# Patient Record
Sex: FEMALE | Race: WHITE
[De-identification: names, ages, dates, MRNs, and addresses within clinical notes are randomized per-mention and may not be internally consistent; named-entity substitution may affect disease eponyms.]

---

## 2017-04-04 ENCOUNTER — HOSPITAL ENCOUNTER (INPATIENT)
Dept: HOSPITAL 74 - FM/S | Age: 82
LOS: 2 days | Discharge: HOME HEALTH | DRG: 470 | End: 2017-04-06
Attending: ORTHOPAEDIC SURGERY | Admitting: ORTHOPAEDIC SURGERY
Payer: COMMERCIAL

## 2017-04-04 VITALS — BODY MASS INDEX: 28.1 KG/M2

## 2017-04-04 DIAGNOSIS — I25.10: ICD-10-CM

## 2017-04-04 DIAGNOSIS — M16.12: Primary | ICD-10-CM

## 2017-04-04 DIAGNOSIS — E78.5: ICD-10-CM

## 2017-04-04 DIAGNOSIS — I10: ICD-10-CM

## 2017-04-04 DIAGNOSIS — Z95.5: ICD-10-CM

## 2017-04-04 PROCEDURE — 8E0Y0CZ ROBOTIC ASSISTED PROCEDURE OF LOWER EXTREMITY, OPEN APPROACH: ICD-10-PCS | Performed by: ORTHOPAEDIC SURGERY

## 2017-04-04 PROCEDURE — 0SRB01A REPLACEMENT OF LEFT HIP JOINT WITH METAL SYNTHETIC SUBSTITUTE, UNCEMENTED, OPEN APPROACH: ICD-10-PCS | Performed by: ORTHOPAEDIC SURGERY

## 2017-04-04 RX ADMIN — CEFAZOLIN SODIUM SCH MLS/HR: 1 INJECTION, SOLUTION INTRAVENOUS at 20:36

## 2017-04-04 RX ADMIN — GABAPENTIN ONE MG: 300 CAPSULE ORAL at 09:45

## 2017-04-04 RX ADMIN — DOCUSATE SODIUM,SENNOSIDES SCH TABLET: 50; 8.6 TABLET, FILM COATED ORAL at 21:24

## 2017-04-04 RX ADMIN — ACETAMINOPHEN SCH MG: 325 TABLET ORAL at 14:30

## 2017-04-04 RX ADMIN — ONDANSETRON PRN MG: 2 INJECTION INTRAMUSCULAR; INTRAVENOUS at 14:30

## 2017-04-04 RX ADMIN — ATORVASTATIN CALCIUM SCH MG: 10 TABLET, FILM COATED ORAL at 21:24

## 2017-04-04 RX ADMIN — CELECOXIB ONE MG: 200 CAPSULE ORAL at 09:45

## 2017-04-04 RX ADMIN — ACETAMINOPHEN SCH MG: 325 TABLET ORAL at 20:36

## 2017-04-04 RX ADMIN — GABAPENTIN SCH MG: 300 CAPSULE ORAL at 21:24

## 2017-04-05 LAB
ANION GAP SERPL CALC-SCNC: 6 MMOL/L (ref 8–16)
CALCIUM SERPL-MCNC: 8.8 MG/DL (ref 8.4–10.2)
CO2 SERPL-SCNC: 26 MMOL/L (ref 22–28)
CREAT SERPL-MCNC: 0.8 MG/DL (ref 0.6–1.3)
DEPRECATED RDW RBC AUTO: 12.8 % (ref 11.6–15.6)
GLUCOSE SERPL-MCNC: 125 MG/DL (ref 74–106)
MCH RBC QN AUTO: 30.7 PG (ref 25.7–33.7)
MCHC RBC AUTO-ENTMCNC: 33.5 G/DL (ref 32–36)
MCV RBC: 91.8 FL (ref 80–96)
PLATELET # BLD AUTO: 202 K/MM3 (ref 134–434)
PMV BLD: 7.6 FL (ref 7.5–11.1)
WBC # BLD AUTO: 10.5 K/MM3 (ref 4–10)

## 2017-04-05 RX ADMIN — DOCUSATE SODIUM,SENNOSIDES SCH TABLET: 50; 8.6 TABLET, FILM COATED ORAL at 09:30

## 2017-04-05 RX ADMIN — GABAPENTIN SCH MG: 300 CAPSULE ORAL at 21:20

## 2017-04-05 RX ADMIN — CEFAZOLIN SODIUM SCH MLS/HR: 1 INJECTION, SOLUTION INTRAVENOUS at 01:24

## 2017-04-05 RX ADMIN — ATORVASTATIN CALCIUM SCH MG: 10 TABLET, FILM COATED ORAL at 21:20

## 2017-04-05 RX ADMIN — DOCUSATE SODIUM,SENNOSIDES SCH TABLET: 50; 8.6 TABLET, FILM COATED ORAL at 21:20

## 2017-04-05 RX ADMIN — ATENOLOL SCH MG: 25 TABLET ORAL at 09:30

## 2017-04-05 RX ADMIN — GABAPENTIN ONE: 300 CAPSULE ORAL at 11:08

## 2017-04-05 RX ADMIN — ACETAMINOPHEN SCH MG: 325 TABLET ORAL at 03:00

## 2017-04-05 RX ADMIN — ACETAMINOPHEN SCH MG: 325 TABLET ORAL at 15:01

## 2017-04-05 RX ADMIN — PANTOPRAZOLE SODIUM SCH MG: 40 TABLET, DELAYED RELEASE ORAL at 09:31

## 2017-04-05 RX ADMIN — CELECOXIB ONE: 200 CAPSULE ORAL at 11:08

## 2017-04-05 RX ADMIN — ACETAMINOPHEN SCH MG: 325 TABLET ORAL at 09:31

## 2017-04-05 RX ADMIN — FERROUS SULFATE TAB EC 324 MG (65 MG FE EQUIVALENT) SCH MG: 324 (65 FE) TABLET DELAYED RESPONSE at 09:31

## 2017-04-05 RX ADMIN — MULTIVITAMIN TABLET SCH TAB: TABLET at 09:31

## 2017-04-05 RX ADMIN — GABAPENTIN SCH MG: 300 CAPSULE ORAL at 09:31

## 2017-04-05 RX ADMIN — ACETAMINOPHEN SCH MG: 325 TABLET ORAL at 20:42

## 2017-04-05 RX ADMIN — VALSARTAN SCH MG: 160 TABLET, FILM COATED ORAL at 09:31

## 2017-04-05 RX ADMIN — ONDANSETRON PRN MG: 2 INJECTION INTRAMUSCULAR; INTRAVENOUS at 15:01

## 2017-04-05 RX ADMIN — ONDANSETRON PRN MG: 2 INJECTION INTRAMUSCULAR; INTRAVENOUS at 07:53

## 2017-04-05 RX ADMIN — ASPIRIN 325 MG ORAL TABLET SCH MG: 325 PILL ORAL at 07:54

## 2017-04-05 NOTE — SPEC
DATE OF OPERATION:  04/05/2017 

 

PREOPERATIVE DIAGNOSIS:  Degenerative Joint Disease, Left Hip

 

POSTOPERATIVE DIAGNOSIS:  Degenerative Joint disease, Left Hip 

 

PROCEDURE:  Left Total Hip Replacement with Robotic Arm Navigation Assistance

(MAKOplasty)

 

SURGEON: Dr. Sarthak Melendez 

 

FIRST ASSISTANT:

 

ANESTHESIA: Spinal and Regional. 

 

ANESTHESIOLOGIST: 

 

CLOSURE:  Tatum total hip system a No. 6 Accolade 2, a 38 plus 3 MDM and 52

titanium acetabular component, No. 1 Vicryl for fascia, 0 and 2-0 subcutaneous, 3-0

Monocryl subcuticular and skin glue for skin, 4-0 undyed Vicryl for pin sites.

 

ESTIMATED BLOOD LOSS:  Less Than 100 mL.

 

COMPLICATIONS:  None. 

 

CONDITION:  To recovery room in stable condition.   

 

DESCRIPTION OF OPERATIVE PROCEDURE: The patient was taken to the operating room. 

Spinal anesthesia as well as sciatic block was administered by the anesthesiologist. 

The IV Kefzol and TXA were administered prophylactically prior to the case.  The

patient was placed in the lateral decubitus position with all prominences

well-padded.  An EKG pad was secured to the inferior pole of the patella for limb

length calculations intraoperatively.  The left hip area was prepped and draped in

the usual sterile fashion. 

 

A 12.0-15.0 cm curved longitudinal incision over the posterolateral aspect of the

greater trochanter was made.  Hemostasis was achieved with Bovie cautery.  Sharp

dissection was carried down to the level of the fascia.  The fascia was opened the

entire length of the incision, spreading the gluteus ramón fibers in the direction

of their origin.  A Charnley retractor was placed in this layer, and care was taken

to be far away from the sciatic nerve.  The short external rotators were detached off

the insertion of the greater trochanter and peeled off the capsule.  A posterior

capsulectomy was then performed.  A checkpoint was malleted into the greater

trochanter.  Three small stab incisions were done on the iliac crest.  Through these

stab incisions, threaded guide pins were drilled into the iliac crest.  These pins

were fastened to the Navigation array. The check point on the greater trochanter, and

the EKG pad on the inferior pole of the patella were used to measure preoperative

limb length and offset.  The hip was then dislocated.  The hip was osteotomized at

the appropriate level as directed by the preoperative template. Anterior and

posterior retractors were placed around the acetabulum.  Circumferential labrum was

excised.  A checkpoint was malleted into the acetabulum superiorly.  The acetabulum

was then registered with the Navigation device with multiple sites within the

acetabulum and around the rim of the acetabulum. I t was then confirmed popping the

blue bubbles, confirming ideal position and confirmation of adequate registration

with the Navigation device.  Using a 48 reamer, which was decided preoperatively on

the preoperative template, the robotic arm was brought into the field and was used to

ream the acetabulum down to the appropriate depth with the appropriate orientation

and inversion applied.  After reaming, a good hemispherical bleeding surface was

encountered in the acetabulum.  A VINH shell of the appropriate size was then

malleted into place achieving excellent fit.  Confirmation of the appropriate

orientation and inversion was confirmed using the probe, and assuring that the

acetabular cup was placed in the ideal position as templated preoperatively.  A real

liner was then clipped into place with a 10 degree lip in the posterior-superior

quadrant.  Anterior and posterior osteophytes were removed using osteotome. 

 

Next, our attention was directed to the femur.  The proximal femur was opened with a

box chisel, rat-tail, anchovy and serial reamers.  This was done until the

appropriate reamer achieved good fit and fill of the proximal femur.  A trial

reduction with the appropriate neck and head, as again measured from our preoperative

template, was performed.  Limb lengths were confirmed both visually and using the

Navigation device, again measuring the inferior pole of the patella and the

checkpoint of the greater trochanter.  This confirmed ideal position of the femoral

component, lengths and offset. The trial components were removed.  The real component

was malleted into place.  The head was cold-welded to the Charnley and the hip was

reduced.  Again, the hip was found to have equal limb lengths as described

previously.  The hip was also taken through a range of motion and found to be stable

in external rotation and extension, was stable in marked flexion, stable in adduction

and internal rotation, and had a positive hang test and negative telescoping. 

 

The hip was irrigated with copious amounts of irrigation.  Hemostasis was achieved. 

Vancomycin powder was sprinkled into the joint.  A second dose of TXA was

administered.  The fascia was closed with No. 1 Vicryl interrupted suture, 0 and 2-0

for subcutaneous, and 3-0 Monocryl subcuticular for skin with skin glue.  This was

followed by an Aquacel dressing.  The patient was flipped into the supine position. 

Bilateral SCDs and an abduction pillow were applied.  X-rays showed good position of

the components. 

 

The patient was awakened from anesthesia and transferred to the recovery room in

stable condition. 

 

COMPLICATIONS:  None.

 

ESTIMATED BLOOD LOSS:  Less than 100 mL.  

 

 

WILDA [id number]

DD: 04/04/2017 14:45

DT: 04/05/2017 12:31

Job #:  [id number]

## 2017-04-05 NOTE — PN
Progress Note (short form)





- Note


Progress Note: 


Ortho





Pt seen and examined s/p left cesar thr pod #1


 Selected Entries











  04/05/17





  06:00


 


Temperature 99.0 F


 


Pulse Rate 55 L


 


Respiratory 18





Rate 


 


Blood Pressure 133/58








dressing c/d/i, calf soft nt


nvi





a/p


PT


dvt ppx


pain control


d/c home tomorrow if stable

## 2017-04-06 VITALS — TEMPERATURE: 98.8 F | SYSTOLIC BLOOD PRESSURE: 141 MMHG | DIASTOLIC BLOOD PRESSURE: 66 MMHG | HEART RATE: 67 BPM

## 2017-04-06 LAB
DEPRECATED RDW RBC AUTO: 13.1 % (ref 11.6–15.6)
MCH RBC QN AUTO: 32.2 PG (ref 25.7–33.7)
MCHC RBC AUTO-ENTMCNC: 35.3 G/DL (ref 32–36)
MCV RBC: 91.1 FL (ref 80–96)
PLATELET # BLD AUTO: 187 K/MM3 (ref 134–434)
PMV BLD: 7.9 FL (ref 7.5–11.1)
WBC # BLD AUTO: 9.8 K/MM3 (ref 4–10)

## 2017-04-06 RX ADMIN — ACETAMINOPHEN SCH: 325 TABLET ORAL at 03:20

## 2017-04-06 RX ADMIN — MULTIVITAMIN TABLET SCH TAB: TABLET at 09:20

## 2017-04-06 RX ADMIN — FERROUS SULFATE TAB EC 324 MG (65 MG FE EQUIVALENT) SCH MG: 324 (65 FE) TABLET DELAYED RESPONSE at 09:19

## 2017-04-06 RX ADMIN — DOCUSATE SODIUM,SENNOSIDES SCH TABLET: 50; 8.6 TABLET, FILM COATED ORAL at 09:20

## 2017-04-06 RX ADMIN — ASPIRIN 325 MG ORAL TABLET SCH MG: 325 PILL ORAL at 08:05

## 2017-04-06 RX ADMIN — VALSARTAN SCH MG: 160 TABLET, FILM COATED ORAL at 09:20

## 2017-04-06 RX ADMIN — ACETAMINOPHEN SCH MG: 325 TABLET ORAL at 08:05

## 2017-04-06 RX ADMIN — PANTOPRAZOLE SODIUM SCH MG: 40 TABLET, DELAYED RELEASE ORAL at 09:20

## 2017-04-06 RX ADMIN — GABAPENTIN SCH MG: 300 CAPSULE ORAL at 09:20

## 2017-04-06 RX ADMIN — ATENOLOL SCH MG: 25 TABLET ORAL at 09:20

## 2017-04-06 NOTE — PN
Progress Note (short form)





- Note


Progress Note: 


Ortho





Pt seen and examined s/p left cesar thr pod #2


 


 Selected Entries











  04/06/17





  06:34


 


Temperature 98.8 F


 


Pulse Rate 67


 


Respiratory 18





Rate 


 


Blood Pressure 141/66








 Laboratory Tests











  04/05/17





  08:30


 


WBC  10.5 H


 


Hgb  11.0


 


Hct  32.9


 


Plt Count  202











dressing c/d/i, calf soft nt


nvi





a/p


PT


dvt ppx


pain control


d/c home today


f/ u i n 1 week

## 2017-04-06 NOTE — PATH
Surgical Pathology Report



Patient Name:  RICCI MOSER

Accession #:  

Med. Rec. #:  [id number]                                                        

   /Age/Gender:  1928 (Age: 88) / F

Account:  [id number]                                                          

             Location: [facility]

Taken:  2017

Received:  2017

Reported:  2017

Physicians:  Sarthak Melendez M.D.

  



Specimen(s) Received

 LEFT FEMORAL HEAD 





Clinical History

Left hip osteoarthritis







Final Diagnosis

FEMORAL HEAD, LEFT, TOTAL HIP REPLACEMENT (MAKOPLASTY):

DEGENERATIVE JOINT DISEASE.





***Electronically Signed***

Alexander Finkelstein, M.D.





Gross Description

Received in formalin, labeled "left femoral head" is a 4.8 x 4.8 x 4.7 cm

femoral head with a 1.0 cm in length portion of femoral neck attached. The

margin of resection is smooth. There is a 4.3 cm in greatest dimension area of

eburnation present. The remaining articular surface is tan-yellow and diffusely

nodular and granular. The underlying trabecular bone is yellow and

heterogeneous. A representative section is submitted in one cassette, following

decalcification.

## 2017-04-06 NOTE — DS
Physical Examination


Vital Signs: 


 Vital Signs











Temperature  98.8 F   04/06/17 06:34


 


Pulse Rate  67   04/06/17 06:34


 


Respiratory Rate  18   04/06/17 06:34


 


Blood Pressure  141/66   04/06/17 06:34


 


O2 Sat by Pulse Oximetry (%)  95   04/06/17 06:34











Labs: 


 CBC, BMP





 04/05/17 08:30 





 04/05/17 08:30 











Discharge Summary


Reason For Visit: OSTEOARTHRITIS


Procedures: Principal: s/p left cesar thr


Hospital Course: 


admitted for elective left cesar thr, uneventful post-op, stable for d/c


Condition: Good





- Instructions


Diet, Activity, Other Instructions: 


Post-op Instructions-Total Hip Replacement                                     

              





     Call the office for a follow-up  appointment in 1 week - [phone]


     Aspirin 325mg daily for 6 weeks. Pain medication was sent into your 

pharmacy.                      


     Apply  Graduated Compression Stockings (TEDs) to both lower extremities-

remove daily


      for hygiene  ONLY


     Apply Sequential Compression Device (SCDs)  to both Lower extremities 

remove for PT 


      and hygiene ONLY  


     Apply cold packs to affected area for 15 minutes every 2 hours.


     Physical Therapist will come to your home for the first 5 days. You will 

be set up with 


      outpatient PT at your first post-operative visit.


     Patient may ambulate as tolerated-encourage self care (at least every 2-3 

hours while awake) 


      with walker or cane


     Maintain Aquacel (waterproof) dressing to operative wound (will be 

removed by surgeon at 


      first office visit)


     Shower with Aquacel dressing in place-if Aquacel integrity compromised, 

remove and apply 


      dry sterile dressing and notify Orthopedist.  DO NOT SHOWER unless 

Orthopedists approves without Aquacel 


      dressing





     CONTACT THE OFFICE FOR ANY CHANGE IN YOUR CONDITION (for example-fever 


      greater than 102 degrees,excessive bleeding from operative site, purulent 

drainage, 


      severe swelling or pain)    GO TO THE EMERGENCY ROOM IF THERE IS A  

MEDICAL EMERGENCY





     Hip Precautions:  


      * Keep a rolled towel under affected heel while in bed or chair (to keep 

knee 


        in extension)


      * Dependent upon approach:


            * Posterior - do not cross legs; do not sit on low chairs or 

toilets.





  * If you have any questions, please do not hesitate to call the office - [phone].


Referrals: 


Sarthak Melendez MD [Staff Physician] - 


Disposition: [facility]/HOME HEALTH CARE





- Home Medications


Comprehensive Discharge Medication List: 


Ambulatory Orders





Atenolol [Tenormin -] 25 mg PO DAILY 12/22/14 


Valsartan [Diovan] 320 mg PO DAILY 12/22/14 


Ferrous Sulfate 325 mg PO DAILY 04/03/17 


Multivit-Min/FA/Lycopen/Lutein [Centrum Silver Tablet] 1 each PO DAILY 04/03/17 


Simvastatin 20 mg PO HS 04/03/17 


Aspirin [ASA -] 325 mg PO DAILY@0800  tablet 04/04/17 


Oxycodone HCl/Acetaminophen [Percocet 5-325 mg Tablet -] 1 - 2 tab PO Q6H #50 

tab MDD 8 04/04/17

## 2023-09-05 ENCOUNTER — OFFICE (OUTPATIENT)
Dept: URBAN - METROPOLITAN AREA CLINIC 30 | Facility: CLINIC | Age: 88
Setting detail: OPHTHALMOLOGY
End: 2023-09-05
Payer: MEDICARE

## 2023-09-05 DIAGNOSIS — H40.033: ICD-10-CM

## 2023-09-05 DIAGNOSIS — H52.03: ICD-10-CM

## 2023-09-05 DIAGNOSIS — H35.3131: ICD-10-CM

## 2023-09-05 DIAGNOSIS — H25.093: ICD-10-CM

## 2023-09-05 PROCEDURE — 92020 GONIOSCOPY: CPT | Performed by: OPHTHALMOLOGY

## 2023-09-05 PROCEDURE — 92250 FUNDUS PHOTOGRAPHY W/I&R: CPT | Performed by: OPHTHALMOLOGY

## 2023-09-05 PROCEDURE — 92004 COMPRE OPH EXAM NEW PT 1/>: CPT | Performed by: OPHTHALMOLOGY

## 2023-09-05 ASSESSMENT — REFRACTION_CURRENTRX
OD_SPHERE: +2.00
OD_CYLINDER: +10.00
OD_VPRISM_DIRECTION: BF
OS_AXIS: 100
OS_VPRISM_DIRECTION: BF
OD_AXIS: 105
OS_SPHERE: +1.50
OS_ADD: +3.25
OD_OVR_VA: 20/
OD_ADD: +3.25
OS_CYLINDER: +1.00
OS_OVR_VA: 20/

## 2023-09-05 ASSESSMENT — REFRACTION_AUTOREFRACTION
OD_CYLINDER: +0.75
OS_CYLINDER: +1.25
OS_SPHERE: +0.75
OD_AXIS: 030
OD_SPHERE: +2.75
OS_AXIS: 130

## 2023-09-05 ASSESSMENT — SPHEQUIV_DERIVED
OS_SPHEQUIV: 1.375
OD_SPHEQUIV: 3.125

## 2023-09-05 ASSESSMENT — CONFRONTATIONAL VISUAL FIELD TEST (CVF)
OS_FINDINGS: FULL
OD_FINDINGS: FULL

## 2023-09-05 ASSESSMENT — TONOMETRY
OS_IOP_MMHG: 17
OD_IOP_MMHG: 17

## 2023-09-05 ASSESSMENT — VISUAL ACUITY
OS_BCVA: 20/25+2
OD_BCVA: 20/25+1